# Patient Record
Sex: MALE | Race: WHITE | NOT HISPANIC OR LATINO | Employment: OTHER | URBAN - METROPOLITAN AREA
[De-identification: names, ages, dates, MRNs, and addresses within clinical notes are randomized per-mention and may not be internally consistent; named-entity substitution may affect disease eponyms.]

---

## 2021-05-14 ENCOUNTER — HOSPITAL ENCOUNTER (INPATIENT)
Facility: MEDICAL CENTER | Age: 66
LOS: 3 days | DRG: 964 | End: 2021-05-17
Attending: EMERGENCY MEDICINE | Admitting: SURGERY
Payer: COMMERCIAL

## 2021-05-14 ENCOUNTER — APPOINTMENT (OUTPATIENT)
Dept: RADIOLOGY | Facility: MEDICAL CENTER | Age: 66
DRG: 964 | End: 2021-05-14
Attending: EMERGENCY MEDICINE
Payer: COMMERCIAL

## 2021-05-14 DIAGNOSIS — S32.10XA CLOSED FRACTURE OF SACRUM, UNSPECIFIED PORTION OF SACRUM, INITIAL ENCOUNTER (HCC): ICD-10-CM
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
## 2021-05-14 DIAGNOSIS — S22.20XA CLOSED FRACTURE OF STERNUM, UNSPECIFIED PORTION OF STERNUM, INITIAL ENCOUNTER: ICD-10-CM

## 2021-05-14 PROBLEM — I10 HYPERTENSION, ESSENTIAL: Status: ACTIVE | Noted: 2021-05-14

## 2021-05-14 PROBLEM — T14.90XA TRAUMA: Status: ACTIVE | Noted: 2021-05-14

## 2021-05-14 PROBLEM — E03.9 HYPOTHYROIDISM: Status: ACTIVE | Noted: 2021-05-14

## 2021-05-14 PROBLEM — Z11.9 SCREENING EXAMINATION FOR INFECTIOUS DISEASE: Status: ACTIVE | Noted: 2021-05-14

## 2021-05-14 PROBLEM — S32.110A: Status: ACTIVE | Noted: 2021-05-14

## 2021-05-14 PROBLEM — Z78.9 NO CONTRAINDICATION TO ANTICOAGULATION THERAPY: Status: ACTIVE | Noted: 2021-05-14

## 2021-05-14 LAB
ABO + RH BLD: NORMAL
ABO GROUP BLD: NORMAL
ALBUMIN SERPL BCP-MCNC: 4.3 G/DL (ref 3.2–4.9)
ALBUMIN/GLOB SERPL: 1.5 G/DL
ALP SERPL-CCNC: 79 U/L (ref 30–99)
ALT SERPL-CCNC: 49 U/L (ref 2–50)
ANION GAP SERPL CALC-SCNC: 9 MMOL/L (ref 7–16)
APTT PPP: 24 SEC (ref 24.7–36)
AST SERPL-CCNC: 58 U/L (ref 12–45)
BILIRUB SERPL-MCNC: 0.6 MG/DL (ref 0.1–1.5)
BLD GP AB SCN SERPL QL: NORMAL
BUN SERPL-MCNC: 18 MG/DL (ref 8–22)
CALCIUM SERPL-MCNC: 9.3 MG/DL (ref 8.5–10.5)
CFT BLD TEG: 4.8 MIN (ref 5–10)
CHLORIDE SERPL-SCNC: 105 MMOL/L (ref 96–112)
CLOT ANGLE BLD TEG: 70.7 DEGREES (ref 53–72)
CLOT LYSIS 30M P MA LENFR BLD TEG: 0 % (ref 0–8)
CO2 SERPL-SCNC: 25 MMOL/L (ref 20–33)
CREAT SERPL-MCNC: 1.07 MG/DL (ref 0.5–1.4)
CT.EXTRINSIC BLD ROTEM: 1.3 MIN (ref 1–3)
ERYTHROCYTE [DISTWIDTH] IN BLOOD BY AUTOMATED COUNT: 42.1 FL (ref 35.9–50)
ETHANOL BLD-MCNC: <10.1 MG/DL (ref 0–10)
GLOBULIN SER CALC-MCNC: 2.9 G/DL (ref 1.9–3.5)
GLUCOSE SERPL-MCNC: 135 MG/DL (ref 65–99)
HCT VFR BLD AUTO: 42.6 % (ref 42–52)
HGB BLD-MCNC: 14.4 G/DL (ref 14–18)
INR PPP: 1.02 (ref 0.87–1.13)
MCF BLD TEG: 67.9 MM (ref 50–70)
MCH RBC QN AUTO: 31.9 PG (ref 27–33)
MCHC RBC AUTO-ENTMCNC: 33.8 G/DL (ref 33.7–35.3)
MCV RBC AUTO: 94.5 FL (ref 81.4–97.8)
PA AA BLD-ACNC: 12.5 %
PA ADP BLD-ACNC: 42.7 %
PLATELET # BLD AUTO: 255 K/UL (ref 164–446)
PMV BLD AUTO: 8.6 FL (ref 9–12.9)
POTASSIUM SERPL-SCNC: 4.2 MMOL/L (ref 3.6–5.5)
PROT SERPL-MCNC: 7.2 G/DL (ref 6–8.2)
PROTHROMBIN TIME: 13.7 SEC (ref 12–14.6)
RBC # BLD AUTO: 4.51 M/UL (ref 4.7–6.1)
RH BLD: NORMAL
SODIUM SERPL-SCNC: 139 MMOL/L (ref 135–145)
TEG ALGORITHM TGALG: ABNORMAL
WBC # BLD AUTO: 15.8 K/UL (ref 4.8–10.8)

## 2021-05-14 PROCEDURE — 80053 COMPREHEN METABOLIC PANEL: CPT

## 2021-05-14 PROCEDURE — 85576 BLOOD PLATELET AGGREGATION: CPT

## 2021-05-14 PROCEDURE — U0003 INFECTIOUS AGENT DETECTION BY NUCLEIC ACID (DNA OR RNA); SEVERE ACUTE RESPIRATORY SYNDROME CORONAVIRUS 2 (SARS-COV-2) (CORONAVIRUS DISEASE [COVID-19]), AMPLIFIED PROBE TECHNIQUE, MAKING USE OF HIGH THROUGHPUT TECHNOLOGIES AS DESCRIBED BY CMS-2020-01-R: HCPCS

## 2021-05-14 PROCEDURE — 96372 THER/PROPH/DIAG INJ SC/IM: CPT

## 2021-05-14 PROCEDURE — 85384 FIBRINOGEN ACTIVITY: CPT

## 2021-05-14 PROCEDURE — U0005 INFEC AGEN DETEC AMPLI PROBE: HCPCS

## 2021-05-14 PROCEDURE — 73060 X-RAY EXAM OF HUMERUS: CPT | Mod: RT

## 2021-05-14 PROCEDURE — 73130 X-RAY EXAM OF HAND: CPT | Mod: RT

## 2021-05-14 PROCEDURE — 36415 COLL VENOUS BLD VENIPUNCTURE: CPT

## 2021-05-14 PROCEDURE — 94760 N-INVAS EAR/PLS OXIMETRY 1: CPT

## 2021-05-14 PROCEDURE — 70450 CT HEAD/BRAIN W/O DYE: CPT

## 2021-05-14 PROCEDURE — 86901 BLOOD TYPING SEROLOGIC RH(D): CPT

## 2021-05-14 PROCEDURE — 85347 COAGULATION TIME ACTIVATED: CPT

## 2021-05-14 PROCEDURE — 700105 HCHG RX REV CODE 258: Performed by: SURGERY

## 2021-05-14 PROCEDURE — 85730 THROMBOPLASTIN TIME PARTIAL: CPT

## 2021-05-14 PROCEDURE — 305948 HCHG GREEN TRAUMA ACT PRE-NOTIFY NO CC

## 2021-05-14 PROCEDURE — 86900 BLOOD TYPING SEROLOGIC ABO: CPT

## 2021-05-14 PROCEDURE — 700117 HCHG RX CONTRAST REV CODE 255: Performed by: EMERGENCY MEDICINE

## 2021-05-14 PROCEDURE — 71045 X-RAY EXAM CHEST 1 VIEW: CPT

## 2021-05-14 PROCEDURE — A9270 NON-COVERED ITEM OR SERVICE: HCPCS | Performed by: SURGERY

## 2021-05-14 PROCEDURE — 700111 HCHG RX REV CODE 636 W/ 250 OVERRIDE (IP): Performed by: SURGERY

## 2021-05-14 PROCEDURE — 85610 PROTHROMBIN TIME: CPT

## 2021-05-14 PROCEDURE — 86850 RBC ANTIBODY SCREEN: CPT

## 2021-05-14 PROCEDURE — 71260 CT THORAX DX C+: CPT

## 2021-05-14 PROCEDURE — 770006 HCHG ROOM/CARE - MED/SURG/GYN SEMI*

## 2021-05-14 PROCEDURE — 72128 CT CHEST SPINE W/O DYE: CPT

## 2021-05-14 PROCEDURE — 82077 ASSAY SPEC XCP UR&BREATH IA: CPT

## 2021-05-14 PROCEDURE — 700102 HCHG RX REV CODE 250 W/ 637 OVERRIDE(OP): Performed by: SURGERY

## 2021-05-14 PROCEDURE — 70486 CT MAXILLOFACIAL W/O DYE: CPT | Mod: MH

## 2021-05-14 PROCEDURE — 72131 CT LUMBAR SPINE W/O DYE: CPT | Mod: MH

## 2021-05-14 PROCEDURE — 85027 COMPLETE CBC AUTOMATED: CPT

## 2021-05-14 PROCEDURE — 99285 EMERGENCY DEPT VISIT HI MDM: CPT | Mod: 25

## 2021-05-14 PROCEDURE — 72125 CT NECK SPINE W/O DYE: CPT | Mod: MH

## 2021-05-14 RX ORDER — LOSARTAN POTASSIUM 25 MG/1
25 TABLET ORAL DAILY
COMMUNITY

## 2021-05-14 RX ORDER — AMOXICILLIN 250 MG
1 CAPSULE ORAL NIGHTLY
Status: DISCONTINUED | OUTPATIENT
Start: 2021-05-14 | End: 2021-05-17 | Stop reason: HOSPADM

## 2021-05-14 RX ORDER — LOSARTAN POTASSIUM 25 MG/1
25 TABLET ORAL DAILY
Status: DISCONTINUED | OUTPATIENT
Start: 2021-05-14 | End: 2021-05-17 | Stop reason: HOSPADM

## 2021-05-14 RX ORDER — DOCUSATE SODIUM 100 MG/1
100 CAPSULE, LIQUID FILLED ORAL 2 TIMES DAILY
Status: DISCONTINUED | OUTPATIENT
Start: 2021-05-14 | End: 2021-05-17 | Stop reason: HOSPADM

## 2021-05-14 RX ORDER — BISACODYL 10 MG
10 SUPPOSITORY, RECTAL RECTAL
Status: DISCONTINUED | OUTPATIENT
Start: 2021-05-14 | End: 2021-05-17 | Stop reason: HOSPADM

## 2021-05-14 RX ORDER — AMOXICILLIN 250 MG
1 CAPSULE ORAL
Status: DISCONTINUED | OUTPATIENT
Start: 2021-05-14 | End: 2021-05-17 | Stop reason: HOSPADM

## 2021-05-14 RX ORDER — CELECOXIB 200 MG/1
200 CAPSULE ORAL 2 TIMES DAILY PRN
Status: DISCONTINUED | OUTPATIENT
Start: 2021-05-19 | End: 2021-05-17 | Stop reason: HOSPADM

## 2021-05-14 RX ORDER — CELECOXIB 200 MG/1
200 CAPSULE ORAL 2 TIMES DAILY
Status: DISCONTINUED | OUTPATIENT
Start: 2021-05-14 | End: 2021-05-17 | Stop reason: HOSPADM

## 2021-05-14 RX ORDER — SODIUM CHLORIDE, SODIUM LACTATE, POTASSIUM CHLORIDE, CALCIUM CHLORIDE 600; 310; 30; 20 MG/100ML; MG/100ML; MG/100ML; MG/100ML
INJECTION, SOLUTION INTRAVENOUS CONTINUOUS
Status: DISCONTINUED | OUTPATIENT
Start: 2021-05-14 | End: 2021-05-15

## 2021-05-14 RX ORDER — ENEMA 19; 7 G/133ML; G/133ML
1 ENEMA RECTAL
Status: DISCONTINUED | OUTPATIENT
Start: 2021-05-14 | End: 2021-05-17 | Stop reason: HOSPADM

## 2021-05-14 RX ORDER — LEVOTHYROXINE SODIUM 0.03 MG/1
25 TABLET ORAL
COMMUNITY

## 2021-05-14 RX ORDER — ACETAMINOPHEN 500 MG
1000 TABLET ORAL EVERY 6 HOURS PRN
Status: DISCONTINUED | OUTPATIENT
Start: 2021-05-19 | End: 2021-05-17 | Stop reason: HOSPADM

## 2021-05-14 RX ORDER — NAPROXEN SODIUM 220 MG
220 TABLET ORAL EVERY MORNING
COMMUNITY

## 2021-05-14 RX ORDER — POLYETHYLENE GLYCOL 3350 17 G/17G
1 POWDER, FOR SOLUTION ORAL 2 TIMES DAILY
Status: DISCONTINUED | OUTPATIENT
Start: 2021-05-14 | End: 2021-05-17 | Stop reason: HOSPADM

## 2021-05-14 RX ORDER — OXYCODONE HYDROCHLORIDE 10 MG/1
10 TABLET ORAL
Status: DISCONTINUED | OUTPATIENT
Start: 2021-05-14 | End: 2021-05-15

## 2021-05-14 RX ORDER — HYDROMORPHONE HYDROCHLORIDE 1 MG/ML
0.5 INJECTION, SOLUTION INTRAMUSCULAR; INTRAVENOUS; SUBCUTANEOUS
Status: DISCONTINUED | OUTPATIENT
Start: 2021-05-14 | End: 2021-05-15

## 2021-05-14 RX ORDER — ACETAMINOPHEN 500 MG
1000 TABLET ORAL EVERY 6 HOURS
Status: DISCONTINUED | OUTPATIENT
Start: 2021-05-14 | End: 2021-05-17 | Stop reason: HOSPADM

## 2021-05-14 RX ORDER — OXYCODONE HYDROCHLORIDE 5 MG/1
5 TABLET ORAL
Status: DISCONTINUED | OUTPATIENT
Start: 2021-05-14 | End: 2021-05-17 | Stop reason: HOSPADM

## 2021-05-14 RX ORDER — LEVOTHYROXINE SODIUM 0.03 MG/1
25 TABLET ORAL
Status: DISCONTINUED | OUTPATIENT
Start: 2021-05-14 | End: 2021-05-17 | Stop reason: HOSPADM

## 2021-05-14 RX ADMIN — LOSARTAN POTASSIUM 25 MG: 25 TABLET, FILM COATED ORAL at 13:50

## 2021-05-14 RX ADMIN — ENOXAPARIN SODIUM 40 MG: 40 INJECTION SUBCUTANEOUS at 13:51

## 2021-05-14 RX ADMIN — IOHEXOL 100 ML: 350 INJECTION, SOLUTION INTRAVENOUS at 10:00

## 2021-05-14 RX ADMIN — ACETAMINOPHEN 1000 MG: 500 TABLET ORAL at 22:48

## 2021-05-14 RX ADMIN — CELECOXIB 200 MG: 200 CAPSULE ORAL at 17:18

## 2021-05-14 RX ADMIN — LEVOTHYROXINE SODIUM 25 MCG: 25 TABLET ORAL at 13:50

## 2021-05-14 RX ADMIN — ACETAMINOPHEN 1000 MG: 500 TABLET ORAL at 13:50

## 2021-05-14 RX ADMIN — SODIUM CHLORIDE, POTASSIUM CHLORIDE, SODIUM LACTATE AND CALCIUM CHLORIDE: 600; 310; 30; 20 INJECTION, SOLUTION INTRAVENOUS at 13:55

## 2021-05-14 RX ADMIN — ACETAMINOPHEN 1000 MG: 500 TABLET ORAL at 17:18

## 2021-05-14 ASSESSMENT — COGNITIVE AND FUNCTIONAL STATUS - GENERAL
CLIMB 3 TO 5 STEPS WITH RAILING: A LOT
MOVING TO AND FROM BED TO CHAIR: A LOT
TOILETING: A LITTLE
WALKING IN HOSPITAL ROOM: A LOT
DRESSING REGULAR LOWER BODY CLOTHING: A LITTLE
MOBILITY SCORE: 14
TURNING FROM BACK TO SIDE WHILE IN FLAT BAD: A LITTLE
STANDING UP FROM CHAIR USING ARMS: A LITTLE
HELP NEEDED FOR BATHING: A LITTLE
SUGGESTED CMS G CODE MODIFIER MOBILITY: CL
MOVING FROM LYING ON BACK TO SITTING ON SIDE OF FLAT BED: A LOT
SUGGESTED CMS G CODE MODIFIER DAILY ACTIVITY: CJ
DAILY ACTIVITIY SCORE: 21

## 2021-05-14 ASSESSMENT — LIFESTYLE VARIABLES
EVER HAD A DRINK FIRST THING IN THE MORNING TO STEADY YOUR NERVES TO GET RID OF A HANGOVER: NO
ON A TYPICAL DAY WHEN YOU DRINK ALCOHOL HOW MANY DRINKS DO YOU HAVE: 0
EVER FELT BAD OR GUILTY ABOUT YOUR DRINKING: NO
AVERAGE NUMBER OF DAYS PER WEEK YOU HAVE A DRINK CONTAINING ALCOHOL: 0
TOTAL SCORE: 0
HOW MANY TIMES IN THE PAST YEAR HAVE YOU HAD 5 OR MORE DRINKS IN A DAY: 0
DOES PATIENT WANT TO STOP DRINKING: NO
TOTAL SCORE: 0
ALCOHOL_USE: NO
HAVE YOU EVER FELT YOU SHOULD CUT DOWN ON YOUR DRINKING: NO
CONSUMPTION TOTAL: NEGATIVE
HAVE PEOPLE ANNOYED YOU BY CRITICIZING YOUR DRINKING: NO
TOTAL SCORE: 0
DO YOU DRINK ALCOHOL: NO

## 2021-05-14 ASSESSMENT — PATIENT HEALTH QUESTIONNAIRE - PHQ9
2. FEELING DOWN, DEPRESSED, IRRITABLE, OR HOPELESS: NOT AT ALL
SUM OF ALL RESPONSES TO PHQ9 QUESTIONS 1 AND 2: 0
1. LITTLE INTEREST OR PLEASURE IN DOING THINGS: NOT AT ALL

## 2021-05-14 ASSESSMENT — FIBROSIS 4 INDEX: FIB4 SCORE: 2.11

## 2021-05-14 ASSESSMENT — PAIN DESCRIPTION - PAIN TYPE
TYPE: ACUTE PAIN
TYPE: ACUTE PAIN

## 2021-05-14 NOTE — ASSESSMENT & PLAN NOTE
Nondisplaced bilateral sacral ala fractures.  Non-operative management.  Weight bearing status - Weightbearing as tolerated BLE with therapy and a walker.  5/17 Sacrum xray completed, reviewed by Dr. Frnace and stable.  Follow up with local orthopedic surgeon in 7-10 days.  Joesph France MD. Orthopedic Surgeon. [facility].
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
## 2021-05-14 NOTE — ED NOTES
Blood drawn for PTT/PT/INR and second ABO.  Mouth swab provided.   Pt talking to wife on phone.  Plan for admission/.

## 2021-05-14 NOTE — PROGRESS NOTES
Pt arrived in room T333-1. Alert and oriented. POC discussed with pt. Verbalized understanding. Reports no further needs. Bed locked and at the lowest position. No family member present at bedside.

## 2021-05-14 NOTE — ED NOTES
Unable to completed med rec at this time   Allergies have been reviewed  Pt states that he last took his medications yesterday (nothing today)  Pt is unsure of what medications he is taking right now and unable to recall what mail order pharmacy he uses   Called pt's wife Cris ([phone]) no answer, left a message to call back
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
## 2021-05-14 NOTE — ASSESSMENT & PLAN NOTE
Nondisplaced sternum fracture with small anterior mediastinal hematoma.  EKG with SR.  Aggressive pulmonary hygiene and multimodal pain management.
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
## 2021-05-14 NOTE — ED NOTES
Pt Wife Marie timmyrogers per pt request.      Wife Marie's number [phone] - please call for any updates or need for transport home information.

## 2021-05-14 NOTE — PROGRESS NOTES
Consult requested 1110 hours  Seen approximately 1530 hours  Nondisplaced sacral U fx  Plan nonop treatment  WBAT with walker - if unable to tolerate, then may need to restrict WB

## 2021-05-14 NOTE — ED NOTES
Med Rec completed per patient's home pharmacy  Allergies reviewed  No ORAL antibiotics in last 14 days

## 2021-05-15 ENCOUNTER — APPOINTMENT (OUTPATIENT)
Dept: RADIOLOGY | Facility: MEDICAL CENTER | Age: 66
DRG: 964 | End: 2021-05-15
Attending: SURGERY
Payer: COMMERCIAL

## 2021-05-15 PROBLEM — T07.XXXA ABRASIONS OF MULTIPLE SITES: Status: ACTIVE | Noted: 2021-05-15

## 2021-05-15 LAB
ALBUMIN SERPL BCP-MCNC: 3.5 G/DL (ref 3.2–4.9)
ALBUMIN/GLOB SERPL: 1.3 G/DL
ALP SERPL-CCNC: 70 U/L (ref 30–99)
ALT SERPL-CCNC: 50 U/L (ref 2–50)
ANION GAP SERPL CALC-SCNC: 10 MMOL/L (ref 7–16)
AST SERPL-CCNC: 82 U/L (ref 12–45)
BASOPHILS # BLD AUTO: 0.2 % (ref 0–1.8)
BASOPHILS # BLD: 0.02 K/UL (ref 0–0.12)
BILIRUB SERPL-MCNC: 1.2 MG/DL (ref 0.1–1.5)
BUN SERPL-MCNC: 19 MG/DL (ref 8–22)
CALCIUM SERPL-MCNC: 8.5 MG/DL (ref 8.5–10.5)
CHLORIDE SERPL-SCNC: 101 MMOL/L (ref 96–112)
CO2 SERPL-SCNC: 25 MMOL/L (ref 20–33)
CREAT SERPL-MCNC: 1.02 MG/DL (ref 0.5–1.4)
EKG IMPRESSION: NORMAL
EOSINOPHIL # BLD AUTO: 0.09 K/UL (ref 0–0.51)
EOSINOPHIL NFR BLD: 1 % (ref 0–6.9)
ERYTHROCYTE [DISTWIDTH] IN BLOOD BY AUTOMATED COUNT: 42 FL (ref 35.9–50)
GLOBULIN SER CALC-MCNC: 2.7 G/DL (ref 1.9–3.5)
GLUCOSE SERPL-MCNC: 106 MG/DL (ref 65–99)
HCT VFR BLD AUTO: 39.3 % (ref 42–52)
HGB BLD-MCNC: 12.7 G/DL (ref 14–18)
IMM GRANULOCYTES # BLD AUTO: 0.06 K/UL (ref 0–0.11)
IMM GRANULOCYTES NFR BLD AUTO: 0.7 % (ref 0–0.9)
LYMPHOCYTES # BLD AUTO: 1.21 K/UL (ref 1–4.8)
LYMPHOCYTES NFR BLD: 14.1 % (ref 22–41)
MCH RBC QN AUTO: 31 PG (ref 27–33)
MCHC RBC AUTO-ENTMCNC: 32.3 G/DL (ref 33.7–35.3)
MCV RBC AUTO: 95.9 FL (ref 81.4–97.8)
MONOCYTES # BLD AUTO: 0.54 K/UL (ref 0–0.85)
MONOCYTES NFR BLD AUTO: 6.3 % (ref 0–13.4)
NEUTROPHILS # BLD AUTO: 6.66 K/UL (ref 1.82–7.42)
NEUTROPHILS NFR BLD: 77.7 % (ref 44–72)
NRBC # BLD AUTO: 0 K/UL
NRBC BLD-RTO: 0 /100 WBC
PLATELET # BLD AUTO: 202 K/UL (ref 164–446)
PMV BLD AUTO: 8.9 FL (ref 9–12.9)
POTASSIUM SERPL-SCNC: 4.2 MMOL/L (ref 3.6–5.5)
PROT SERPL-MCNC: 6.2 G/DL (ref 6–8.2)
RBC # BLD AUTO: 4.1 M/UL (ref 4.7–6.1)
SARS-COV-2 RNA RESP QL NAA+PROBE: NOTDETECTED
SODIUM SERPL-SCNC: 136 MMOL/L (ref 135–145)
SPECIMEN SOURCE: NORMAL
WBC # BLD AUTO: 8.6 K/UL (ref 4.8–10.8)

## 2021-05-15 PROCEDURE — 700102 HCHG RX REV CODE 250 W/ 637 OVERRIDE(OP): Performed by: SURGERY

## 2021-05-15 PROCEDURE — 93010 ELECTROCARDIOGRAM REPORT: CPT | Performed by: INTERNAL MEDICINE

## 2021-05-15 PROCEDURE — A9270 NON-COVERED ITEM OR SERVICE: HCPCS | Performed by: NURSE PRACTITIONER

## 2021-05-15 PROCEDURE — 700111 HCHG RX REV CODE 636 W/ 250 OVERRIDE (IP): Performed by: SURGERY

## 2021-05-15 PROCEDURE — 93005 ELECTROCARDIOGRAM TRACING: CPT | Performed by: NURSE PRACTITIONER

## 2021-05-15 PROCEDURE — 36415 COLL VENOUS BLD VENIPUNCTURE: CPT

## 2021-05-15 PROCEDURE — 71045 X-RAY EXAM CHEST 1 VIEW: CPT

## 2021-05-15 PROCEDURE — 700102 HCHG RX REV CODE 250 W/ 637 OVERRIDE(OP): Performed by: NURSE PRACTITIONER

## 2021-05-15 PROCEDURE — A9270 NON-COVERED ITEM OR SERVICE: HCPCS | Performed by: SURGERY

## 2021-05-15 PROCEDURE — 770006 HCHG ROOM/CARE - MED/SURG/GYN SEMI*

## 2021-05-15 PROCEDURE — 700101 HCHG RX REV CODE 250: Performed by: NURSE PRACTITIONER

## 2021-05-15 PROCEDURE — 85025 COMPLETE CBC W/AUTO DIFF WBC: CPT

## 2021-05-15 PROCEDURE — 94760 N-INVAS EAR/PLS OXIMETRY 1: CPT

## 2021-05-15 PROCEDURE — 80053 COMPREHEN METABOLIC PANEL: CPT

## 2021-05-15 RX ORDER — LIDOCAINE 50 MG/G
1-3 PATCH TOPICAL EVERY 24 HOURS
Status: DISCONTINUED | OUTPATIENT
Start: 2021-05-15 | End: 2021-05-17 | Stop reason: HOSPADM

## 2021-05-15 RX ORDER — GABAPENTIN 100 MG/1
100 CAPSULE ORAL 3 TIMES DAILY
Status: DISCONTINUED | OUTPATIENT
Start: 2021-05-15 | End: 2021-05-17 | Stop reason: HOSPADM

## 2021-05-15 RX ORDER — METAXALONE 800 MG/1
800 TABLET ORAL 3 TIMES DAILY
Status: DISCONTINUED | OUTPATIENT
Start: 2021-05-15 | End: 2021-05-17 | Stop reason: HOSPADM

## 2021-05-15 RX ORDER — BACITRACIN ZINC AND POLYMYXIN B SULFATE 500; 1000 [USP'U]/G; [USP'U]/G
OINTMENT TOPICAL 2 TIMES DAILY
Status: DISCONTINUED | OUTPATIENT
Start: 2021-05-15 | End: 2021-05-17 | Stop reason: HOSPADM

## 2021-05-15 RX ADMIN — CELECOXIB 200 MG: 200 CAPSULE ORAL at 17:52

## 2021-05-15 RX ADMIN — CELECOXIB 200 MG: 200 CAPSULE ORAL at 05:00

## 2021-05-15 RX ADMIN — METAXALONE 800 MG: 800 TABLET ORAL at 11:58

## 2021-05-15 RX ADMIN — ACETAMINOPHEN 1000 MG: 500 TABLET ORAL at 17:52

## 2021-05-15 RX ADMIN — ACETAMINOPHEN 1000 MG: 500 TABLET ORAL at 05:00

## 2021-05-15 RX ADMIN — GABAPENTIN 100 MG: 100 CAPSULE ORAL at 11:58

## 2021-05-15 RX ADMIN — Medication 2 EACH: at 17:57

## 2021-05-15 RX ADMIN — METAXALONE 800 MG: 800 TABLET ORAL at 17:52

## 2021-05-15 RX ADMIN — OXYCODONE 5 MG: 5 TABLET ORAL at 20:20

## 2021-05-15 RX ADMIN — OXYCODONE 5 MG: 5 TABLET ORAL at 04:59

## 2021-05-15 RX ADMIN — LIDOCAINE 1 PATCH: 50 PATCH TOPICAL at 09:27

## 2021-05-15 RX ADMIN — LEVOTHYROXINE SODIUM 25 MCG: 25 TABLET ORAL at 05:00

## 2021-05-15 RX ADMIN — ENOXAPARIN SODIUM 40 MG: 40 INJECTION SUBCUTANEOUS at 05:01

## 2021-05-15 RX ADMIN — LOSARTAN POTASSIUM 25 MG: 25 TABLET, FILM COATED ORAL at 05:00

## 2021-05-15 RX ADMIN — OXYCODONE 5 MG: 5 TABLET ORAL at 14:39

## 2021-05-15 RX ADMIN — GABAPENTIN 100 MG: 100 CAPSULE ORAL at 17:52

## 2021-05-15 ASSESSMENT — ENCOUNTER SYMPTOMS
SENSORY CHANGE: 0
DOUBLE VISION: 0
MYALGIAS: 0
BACK PAIN: 1
SPEECH CHANGE: 0
VOMITING: 0
HEADACHES: 0
NAUSEA: 0
BLURRED VISION: 0
FOCAL WEAKNESS: 0
DIZZINESS: 0
ROS GI COMMENTS: BM PRIOR TO ARRIVAL
TINGLING: 0
SHORTNESS OF BREATH: 0
CHILLS: 0
FEVER: 0
ABDOMINAL PAIN: 0
NECK PAIN: 0

## 2021-05-15 ASSESSMENT — PAIN DESCRIPTION - PAIN TYPE
TYPE: ACUTE PAIN

## 2021-05-15 ASSESSMENT — COPD QUESTIONNAIRES
DO YOU EVER COUGH UP ANY MUCUS OR PHLEGM?: NO/ONLY WITH OCCASIONAL COLDS OR INFECTIONS
HAVE YOU SMOKED AT LEAST 100 CIGARETTES IN YOUR ENTIRE LIFE: NO/DON'T KNOW
COPD SCREENING SCORE: 2
DURING THE PAST 4 WEEKS HOW MUCH DID YOU FEEL SHORT OF BREATH: NONE/LITTLE OF THE TIME

## 2021-05-15 ASSESSMENT — LIFESTYLE VARIABLES: SUBSTANCE_ABUSE: 0

## 2021-05-15 NOTE — PROGRESS NOTES
TRAUMA TERTIARY SURVEY     Mental status adequate for full examination?: Yes    Spine cleared (radiologically and/or clinically): Yes    PHYSICAL EXAMINATION:  Vitals: BP (!) 95/59   Pulse 91   Temp 36.2 °C (97.2 °F) (Temporal)   Resp 17   Ht 1.829 m (6')   Wt 96.5 kg (212 lb 11.2 oz)   SpO2 96%   BMI 28.85 kg/m²   Constitutional:     General Appearance: appears stated age, is in no apparent distress, is well developed and well nourished.  HEENT:     No significant external craniofacial trauma. The pupils are equal, round, and reactive to light bilaterally. The extraocular muscles are intact bilaterally.. The nares and oropharynx are clear. The midface and jaw are stable. No malocclusion is evident.  Neck:    The cervical spine is supple and non tender. Normal range of motion. No posterior midline cervical-spine tenderness, no evidence of intoxication, normal level of alertness (Tez Coma Scale 15), no focal neurologic deficit, and no painful distracting injuries. The trachea is midline. No significant abrasions, lacerations, contusions, punctures, or swelling. No crepitance. No jugulovenous distension.  Respiratory:   Inspection: Unlabored respirations, no intercostal retractions, paradoxical motion, or accessory muscle use.   Palpation:  The chest is nontender. The clavicles are non deformed bilaterally..   Auscultation: clear to auscultation.  Cardiovascular:   Auscultation: regular rate and rhythm.   Peripheral Pulses: Normal.   Abdomen:   Abdomen is soft, nontender, without organomegaly or masses.  Genitourinary:   (MALE): Not visualized.  Musculoskeletal:   Pelvic and low back tenderness.  No significant angulation, deformity, or soft tissue injury involving the upper and lower extremities. Normal range of motion. Right hand edematous with ecchymosis, full ROM.  Skin:   The skin is warm and dry. Scattered abrasions/skin tears to BUE.  Neurologic:    Tez Coma Scale (GCS) 15 [id number]. Neurologic  examination revealed no focal deficits noted, mental status intact.  Psychiatric:   The patient does not appear depressed or anxious.    IMAGING:  DX-CHEST-PORTABLE (1 VIEW)   Final Result         1.  No acute cardiopulmonary disease.      DX-HAND 3+ RIGHT   Final Result      1.  There is no acute fracture of the right hand.   2.  There is multifocal mild degenerative change.      CT-CHEST,ABDOMEN,PELVIS WITH   Final Result      Anterior mediastinal hematoma with nondisplaced sternal fractures.      Nondisplaced fractures of the bilateral sacral alar are noted.      Minimal stranding in the left pelvis is likely posttraumatic.      Subcutaneous stranding in the left upper thorax and anterior right thorax is likely posttraumatic.      No solid organ or vascular injury is identified.      Colonic diverticulosis.      For description of thoracic spine findings, please refer to dedicated CT of the thoracic spine.      Mild cardiomegaly.         CT-TSPINE W/O PLUS RECONS   Final Result      1.  There is no acute fracture of the thoracic spine.   2.  There are chronic fracture deformities with mild kyphosis at the T11-T12 level.      CT-LSPINE W/O PLUS RECONS   Final Result      1.  There is no acute fracture of lumbar spine.   2.  There is chronic fracture deformity at the thoracolumbar junction.      CT-HEAD W/O   Final Result      No acute intracranial abnormality is identified.      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      There are periventricular and subcortical white matter changes present.  This finding is nonspecific and could be from previous small vessel ischemia, demyelination, or gliosis.         CT-MAXILLOFACIAL W/O PLUS RECONS   Final Result         1.  There is no acute displaced fracture of the facial bones or orbital regions.      CT-CSPINE WITHOUT PLUS RECONS   Final Result      1.  There is no acute fracture of the cervical spine.   2.  There is multilevel degenerative change with  very minimal amount of neural foraminal narrowing.         DX-HUMERUS 2+ RIGHT   Final Result      No evidence of acute fracture or dislocation.      Degenerative changes.      Old postsurgical and posttraumatic changes of the proximal humerus.      Soft tissue swelling.         DX-CHEST-LIMITED (1 VIEW)   Final Result      No acute cardiopulmonary process is identified.        All current laboratory studies/radiology exams reviewed: Yes    Completed Consultations:  Dr. France, orthopedic surgery     Pending Consultations:  None    Newly Identified Diagnoses and Injuries:  None    TOTAL RAP SCORE:  RAP Score Total: 9      ETOH Screening  CAGE Score: 0  Assessment complete date: 5/15/2021 (Admission BA negative, CAGE negative)

## 2021-05-15 NOTE — CARE PLAN
Problem: Pain - Standard  Goal: Alleviation of pain or a reduction in pain to the patient’s comfort goal  Outcome: Progressing     Problem: Fall Risk  Goal: Patient will remain free from falls  Outcome: Progressing

## 2021-05-15 NOTE — PROGRESS NOTES
2 RN Skin Check    2 RN skin check completed with ALVIN RN.   Devices in place: SCD, NC, PIV.  Skin assessed under devices: yes.  Confirmed pressure ulcers found on: n/a.  New potential pressure ulcers noted on n/a.   Wound consult placed n/a.  The following interventions in place pillows in place for support and positioning.    Skin: Scattered bruising to BLE. Ears, Elbows, Knees, Heels and Sacrum intact. Patient able to turn self in bed.
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
## 2021-05-16 PROBLEM — R93.0 ABNORMAL CT OF THE HEAD: Status: ACTIVE | Noted: 2021-05-16

## 2021-05-16 LAB
ALBUMIN SERPL BCP-MCNC: 3.4 G/DL (ref 3.2–4.9)
ALBUMIN/GLOB SERPL: 1.3 G/DL
ALP SERPL-CCNC: 66 U/L (ref 30–99)
ALT SERPL-CCNC: 44 U/L (ref 2–50)
ANION GAP SERPL CALC-SCNC: 7 MMOL/L (ref 7–16)
AST SERPL-CCNC: 59 U/L (ref 12–45)
BASOPHILS # BLD AUTO: 0.4 % (ref 0–1.8)
BASOPHILS # BLD: 0.03 K/UL (ref 0–0.12)
BILIRUB SERPL-MCNC: 0.3 MG/DL (ref 0.1–1.5)
BUN SERPL-MCNC: 23 MG/DL (ref 8–22)
CALCIUM SERPL-MCNC: 8.5 MG/DL (ref 8.5–10.5)
CHLORIDE SERPL-SCNC: 101 MMOL/L (ref 96–112)
CO2 SERPL-SCNC: 24 MMOL/L (ref 20–33)
CREAT SERPL-MCNC: 1.14 MG/DL (ref 0.5–1.4)
EOSINOPHIL # BLD AUTO: 0.23 K/UL (ref 0–0.51)
EOSINOPHIL NFR BLD: 2.7 % (ref 0–6.9)
ERYTHROCYTE [DISTWIDTH] IN BLOOD BY AUTOMATED COUNT: 41.8 FL (ref 35.9–50)
GLOBULIN SER CALC-MCNC: 2.7 G/DL (ref 1.9–3.5)
GLUCOSE SERPL-MCNC: 104 MG/DL (ref 65–99)
HCT VFR BLD AUTO: 35.7 % (ref 42–52)
HGB BLD-MCNC: 11.8 G/DL (ref 14–18)
IMM GRANULOCYTES # BLD AUTO: 0.04 K/UL (ref 0–0.11)
IMM GRANULOCYTES NFR BLD AUTO: 0.5 % (ref 0–0.9)
LYMPHOCYTES # BLD AUTO: 1.5 K/UL (ref 1–4.8)
LYMPHOCYTES NFR BLD: 17.7 % (ref 22–41)
MCH RBC QN AUTO: 31.6 PG (ref 27–33)
MCHC RBC AUTO-ENTMCNC: 33.1 G/DL (ref 33.7–35.3)
MCV RBC AUTO: 95.7 FL (ref 81.4–97.8)
MONOCYTES # BLD AUTO: 0.56 K/UL (ref 0–0.85)
MONOCYTES NFR BLD AUTO: 6.6 % (ref 0–13.4)
NEUTROPHILS # BLD AUTO: 6.11 K/UL (ref 1.82–7.42)
NEUTROPHILS NFR BLD: 72.1 % (ref 44–72)
NRBC # BLD AUTO: 0 K/UL
NRBC BLD-RTO: 0 /100 WBC
PLATELET # BLD AUTO: 173 K/UL (ref 164–446)
PMV BLD AUTO: 8.6 FL (ref 9–12.9)
POTASSIUM SERPL-SCNC: 3.8 MMOL/L (ref 3.6–5.5)
PROT SERPL-MCNC: 6.1 G/DL (ref 6–8.2)
RBC # BLD AUTO: 3.73 M/UL (ref 4.7–6.1)
SODIUM SERPL-SCNC: 132 MMOL/L (ref 135–145)
WBC # BLD AUTO: 8.5 K/UL (ref 4.8–10.8)

## 2021-05-16 PROCEDURE — 770001 HCHG ROOM/CARE - MED/SURG/GYN PRIV*

## 2021-05-16 PROCEDURE — 80053 COMPREHEN METABOLIC PANEL: CPT

## 2021-05-16 PROCEDURE — 700102 HCHG RX REV CODE 250 W/ 637 OVERRIDE(OP): Performed by: SURGERY

## 2021-05-16 PROCEDURE — 700111 HCHG RX REV CODE 636 W/ 250 OVERRIDE (IP): Performed by: NURSE PRACTITIONER

## 2021-05-16 PROCEDURE — 97161 PT EVAL LOW COMPLEX 20 MIN: CPT

## 2021-05-16 PROCEDURE — A9270 NON-COVERED ITEM OR SERVICE: HCPCS | Performed by: NURSE PRACTITIONER

## 2021-05-16 PROCEDURE — 700102 HCHG RX REV CODE 250 W/ 637 OVERRIDE(OP): Performed by: NURSE PRACTITIONER

## 2021-05-16 PROCEDURE — 36415 COLL VENOUS BLD VENIPUNCTURE: CPT

## 2021-05-16 PROCEDURE — 94760 N-INVAS EAR/PLS OXIMETRY 1: CPT

## 2021-05-16 PROCEDURE — A9270 NON-COVERED ITEM OR SERVICE: HCPCS | Performed by: SURGERY

## 2021-05-16 PROCEDURE — 700101 HCHG RX REV CODE 250: Performed by: NURSE PRACTITIONER

## 2021-05-16 PROCEDURE — 85025 COMPLETE CBC W/AUTO DIFF WBC: CPT

## 2021-05-16 PROCEDURE — 97166 OT EVAL MOD COMPLEX 45 MIN: CPT

## 2021-05-16 RX ADMIN — ACETAMINOPHEN 1000 MG: 500 TABLET ORAL at 05:03

## 2021-05-16 RX ADMIN — GABAPENTIN 100 MG: 100 CAPSULE ORAL at 11:44

## 2021-05-16 RX ADMIN — Medication 1 EACH: at 05:05

## 2021-05-16 RX ADMIN — LIDOCAINE 2 PATCH: 50 PATCH TOPICAL at 10:25

## 2021-05-16 RX ADMIN — CELECOXIB 200 MG: 200 CAPSULE ORAL at 05:03

## 2021-05-16 RX ADMIN — ACETAMINOPHEN 1000 MG: 500 TABLET ORAL at 23:16

## 2021-05-16 RX ADMIN — GABAPENTIN 100 MG: 100 CAPSULE ORAL at 17:31

## 2021-05-16 RX ADMIN — ACETAMINOPHEN 1000 MG: 500 TABLET ORAL at 17:22

## 2021-05-16 RX ADMIN — ACETAMINOPHEN 1000 MG: 500 TABLET ORAL at 11:44

## 2021-05-16 RX ADMIN — METAXALONE 800 MG: 800 TABLET ORAL at 17:23

## 2021-05-16 RX ADMIN — ENOXAPARIN SODIUM 30 MG: 30 INJECTION SUBCUTANEOUS at 17:24

## 2021-05-16 RX ADMIN — METAXALONE 800 MG: 800 TABLET ORAL at 11:44

## 2021-05-16 RX ADMIN — Medication 2 EACH: at 17:25

## 2021-05-16 RX ADMIN — LOSARTAN POTASSIUM 25 MG: 25 TABLET, FILM COATED ORAL at 05:03

## 2021-05-16 RX ADMIN — GABAPENTIN 100 MG: 100 CAPSULE ORAL at 05:03

## 2021-05-16 RX ADMIN — LEVOTHYROXINE SODIUM 25 MCG: 25 TABLET ORAL at 05:03

## 2021-05-16 RX ADMIN — METAXALONE 800 MG: 800 TABLET ORAL at 05:03

## 2021-05-16 RX ADMIN — CELECOXIB 200 MG: 200 CAPSULE ORAL at 17:23

## 2021-05-16 RX ADMIN — ENOXAPARIN SODIUM 30 MG: 30 INJECTION SUBCUTANEOUS at 05:02

## 2021-05-16 ASSESSMENT — ACTIVITIES OF DAILY LIVING (ADL): TOILETING: INDEPENDENT

## 2021-05-16 ASSESSMENT — PAIN DESCRIPTION - PAIN TYPE
TYPE: ACUTE PAIN

## 2021-05-16 ASSESSMENT — COGNITIVE AND FUNCTIONAL STATUS - GENERAL
STANDING UP FROM CHAIR USING ARMS: A LITTLE
TURNING FROM BACK TO SIDE WHILE IN FLAT BAD: A LITTLE
CLIMB 3 TO 5 STEPS WITH RAILING: A LITTLE
PERSONAL GROOMING: A LITTLE
TOILETING: A LITTLE
SUGGESTED CMS G CODE MODIFIER DAILY ACTIVITY: CK
SUGGESTED CMS G CODE MODIFIER MOBILITY: CK
DAILY ACTIVITIY SCORE: 19
DRESSING REGULAR LOWER BODY CLOTHING: A LITTLE
DRESSING REGULAR UPPER BODY CLOTHING: A LITTLE
MOBILITY SCORE: 18
WALKING IN HOSPITAL ROOM: A LITTLE
MOVING TO AND FROM BED TO CHAIR: A LITTLE
MOVING FROM LYING ON BACK TO SITTING ON SIDE OF FLAT BED: A LITTLE
HELP NEEDED FOR BATHING: A LITTLE

## 2021-05-16 ASSESSMENT — ENCOUNTER SYMPTOMS
DIZZINESS: 0
ABDOMINAL PAIN: 0
SPEECH CHANGE: 0
BACK PAIN: 0
DOUBLE VISION: 0
NECK PAIN: 0
MYALGIAS: 0
HEADACHES: 0
NAUSEA: 0
SHORTNESS OF BREATH: 0
ROS GI COMMENTS: BM PRIOR TO ARRIVAL
SENSORY CHANGE: 0
FOCAL WEAKNESS: 0
CHILLS: 0
FEVER: 0
TINGLING: 0
VOMITING: 0
BLURRED VISION: 0

## 2021-05-16 ASSESSMENT — GAIT ASSESSMENTS
ASSISTIVE DEVICE: FRONT WHEEL WALKER
GAIT LEVEL OF ASSIST: SUPERVISED
DISTANCE (FEET): 150
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
## 2021-05-16 NOTE — PROGRESS NOTES
Pain improved  Up on walker when I saw him  No bladder or bowel symptoms  AVSS  + DF/PF  Mobilize with walker  Check lateral sacrum XR tomorrow
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
## 2021-05-16 NOTE — CARE PLAN
Problem: Pain - Standard  Goal: Alleviation of pain or a reduction in pain to the patient’s comfort goal  Outcome: Progressing     Problem: Respiratory/Oxygenation Function Post-Surgical  Goal: Patient will achieve/maintain normal respiratory rate/effort  Outcome: Progressing  Flowsheets (Taken 5/16/2021 0802)  Incentive Spirometer Volume: 2000 mL   The patient is Stable - Low risk of patient condition declining or worsening         Progress made toward(s) clinical / shift goals:  Progressing    Patient is not progressing towards the following goals:
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
## 2021-05-16 NOTE — CARE PLAN
Problem: Pain - Standard  Goal: Alleviation of pain or a reduction in pain to the patient’s comfort goal  5/15/2021 2300 by Dawn Cartagena R.N.  Outcome: Progressing  5/15/2021 2300 by Dawn Cartagena R.N.  Outcome: Progressing     Problem: Fall Risk  Goal: Patient will remain free from falls  5/15/2021 2300 by BREONNA TijerinaN.  Outcome: Progressing  5/15/2021 2300 by Dawn Cartagena R.N.  Outcome: Progressing
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
## 2021-05-17 ENCOUNTER — APPOINTMENT (OUTPATIENT)
Dept: RADIOLOGY | Facility: MEDICAL CENTER | Age: 66
DRG: 964 | End: 2021-05-17
Attending: NURSE PRACTITIONER
Payer: COMMERCIAL

## 2021-05-17 VITALS
BODY MASS INDEX: 28.81 KG/M2 | WEIGHT: 212.7 LBS | HEIGHT: 72 IN | HEART RATE: 69 BPM | TEMPERATURE: 97.1 F | SYSTOLIC BLOOD PRESSURE: 134 MMHG | DIASTOLIC BLOOD PRESSURE: 81 MMHG | RESPIRATION RATE: 17 BRPM | OXYGEN SATURATION: 96 %

## 2021-05-17 LAB
MAGNESIUM SERPL-MCNC: 2.3 MG/DL (ref 1.5–2.5)
PHOSPHATE SERPL-MCNC: 3.2 MG/DL (ref 2.5–4.5)

## 2021-05-17 PROCEDURE — 71046 X-RAY EXAM CHEST 2 VIEWS: CPT

## 2021-05-17 PROCEDURE — 83735 ASSAY OF MAGNESIUM: CPT

## 2021-05-17 PROCEDURE — 700102 HCHG RX REV CODE 250 W/ 637 OVERRIDE(OP): Performed by: SURGERY

## 2021-05-17 PROCEDURE — 700102 HCHG RX REV CODE 250 W/ 637 OVERRIDE(OP): Performed by: NURSE PRACTITIONER

## 2021-05-17 PROCEDURE — 94760 N-INVAS EAR/PLS OXIMETRY 1: CPT

## 2021-05-17 PROCEDURE — 84100 ASSAY OF PHOSPHORUS: CPT

## 2021-05-17 PROCEDURE — 36415 COLL VENOUS BLD VENIPUNCTURE: CPT

## 2021-05-17 PROCEDURE — A9270 NON-COVERED ITEM OR SERVICE: HCPCS | Performed by: SURGERY

## 2021-05-17 PROCEDURE — 700101 HCHG RX REV CODE 250: Performed by: NURSE PRACTITIONER

## 2021-05-17 PROCEDURE — 72220 X-RAY EXAM SACRUM TAILBONE: CPT

## 2021-05-17 PROCEDURE — A9270 NON-COVERED ITEM OR SERVICE: HCPCS | Performed by: NURSE PRACTITIONER

## 2021-05-17 PROCEDURE — 700111 HCHG RX REV CODE 636 W/ 250 OVERRIDE (IP): Performed by: NURSE PRACTITIONER

## 2021-05-17 RX ORDER — ACETAMINOPHEN 325 MG/1
650 TABLET ORAL EVERY 6 HOURS PRN
COMMUNITY
Start: 2021-05-17

## 2021-05-17 RX ORDER — BACITRACIN ZINC AND POLYMYXIN B SULFATE 500; 1000 [USP'U]/G; [USP'U]/G
OINTMENT TOPICAL PRN
COMMUNITY
Start: 2021-05-17

## 2021-05-17 RX ADMIN — LEVOTHYROXINE SODIUM 25 MCG: 25 TABLET ORAL at 04:37

## 2021-05-17 RX ADMIN — Medication 2 EACH: at 04:39

## 2021-05-17 RX ADMIN — LOSARTAN POTASSIUM 25 MG: 25 TABLET, FILM COATED ORAL at 04:37

## 2021-05-17 RX ADMIN — GABAPENTIN 100 MG: 100 CAPSULE ORAL at 12:34

## 2021-05-17 RX ADMIN — GABAPENTIN 100 MG: 100 CAPSULE ORAL at 04:39

## 2021-05-17 RX ADMIN — ACETAMINOPHEN 1000 MG: 500 TABLET ORAL at 12:34

## 2021-05-17 RX ADMIN — ACETAMINOPHEN 1000 MG: 500 TABLET ORAL at 04:37

## 2021-05-17 RX ADMIN — ENOXAPARIN SODIUM 30 MG: 30 INJECTION SUBCUTANEOUS at 04:37

## 2021-05-17 RX ADMIN — CELECOXIB 200 MG: 200 CAPSULE ORAL at 04:36

## 2021-05-17 ASSESSMENT — ENCOUNTER SYMPTOMS
FOCAL WEAKNESS: 0
HEADACHES: 0
DIZZINESS: 0
BLURRED VISION: 0
VOMITING: 0
SHORTNESS OF BREATH: 0
SPEECH CHANGE: 0
ABDOMINAL PAIN: 0
BACK PAIN: 0
MYALGIAS: 0
NECK PAIN: 0
NAUSEA: 0
DOUBLE VISION: 0
SENSORY CHANGE: 0
CONSTIPATION: 0
FEVER: 0
TINGLING: 0
CHILLS: 0

## 2021-05-17 ASSESSMENT — PAIN DESCRIPTION - PAIN TYPE: TYPE: ACUTE PAIN

## 2021-05-17 NOTE — CARE PLAN
The patient is Stable - Low risk of patient condition declining or worsening         Progress made toward(s) clinical / shift goals:      Problem: Knowledge Deficit - Standard  Goal: Patient and family/care givers will demonstrate understanding of plan of care, disease process/condition, diagnostic tests and medications  Outcome: Progressing     Problem: Pain - Standard  Goal: Alleviation of pain or a reduction in pain to the patient’s comfort goal  Outcome: Progressing   Available pain medications reviewed with patient. Pain managed by scheduled medications. Encouraged to call if pain is no longer managed.     Problem: Fall Risk  Goal: Patient will remain free from falls  Outcome: Progressing   Fall precautions in place. Patient rounded on hourly. Clutter-free environment maintained. Bed alarm on, bed locked and in lowest position. Call light in reach.

## 2021-05-17 NOTE — DISCHARGE INSTRUCTIONS
Discharge Instructions    Discharged to home by car with relative. Discharged via wheelchair, hospital escort: Yes.  Special equipment needed: Not Applicable    Be sure to schedule a follow-up appointment with your primary care doctor or any specialists as instructed.     Discharge Plan:        I understand that a diet low in cholesterol, fat, and sodium is recommended for good health. Unless I have been given specific instructions below for another diet, I accept this instruction as my diet prescription.   Other diet: reg    Special Instructions: None    · Is patient discharged on Warfarin / Coumadin?   No     Depression / Suicide Risk    As you are discharged from this [facility] facility, it is important to learn how to keep safe from harming yourself.    Recognize the warning signs:  · Abrupt changes in personality, positive or negative- including increase in energy   · Giving away possessions  · Change in eating patterns- significant weight changes-  positive or negative  · Change in sleeping patterns- unable to sleep or sleeping all the time   · Unwillingness or inability to communicate  · Depression  · Unusual sadness, discouragement and loneliness  · Talk of wanting to die  · Neglect of personal appearance   · Rebelliousness- reckless behavior  · Withdrawal from people/activities they love  · Confusion- inability to concentrate     If you or a loved one observes any of these behaviors or has concerns about self-harm, here's what you can do:  · Talk about it- your feelings and reasons for harming yourself  · Remove any means that you might use to hurt yourself (examples: pills, rope, extension cords, firearm)  · Get professional help from the community (Mental Health, Substance Abuse, psychological counseling)  · Do not be alone:Call your Safe Contact- someone whom you trust who will be there for you.  · Call your local CRISIS HOTLINE 201-4210 or 692-066-7437  · Call your local Children's Mobile Crisis  Response Team [facility] (979) 428-0341 or www.[employer].appAttach  · Call the toll free National Suicide Prevention Hotlines   · National Suicide Prevention Lifeline 541-788-VGMH (7607)  · National Hope Line Network 800-SUICIDE (937-9584)      - Call or seek medical attention for questions or concerns  - Follow up with Dr. Estevez as needed  - Follow up with Dr. France, or local orthopedic surgeon, in 1 weeks time for repeat pelvic xrays, continue weightbearing as tolerated to bilateral lower extremities with a walker  - Follow up with primary care provider within one weeks time, may need a referral to neurology for abnormal CT of brain  - Resume regular diet  - May take over the counter acetaminophen or ibuprofen as needed for pain  - No swimming, hot tubs, baths or wound submersion until cleared by outpatient provider. May shower  - Apply over the counter polysporin ointment to abrasions as needed  - No contact sports, strenuous activities, or heavy lifting until cleared by outpatient provider  - If respiratory decompensation, persistent or worsening pain, changes in sensation or motor function, or signs or symptoms of infection occur seek medical attention
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
## 2021-05-17 NOTE — PROGRESS NOTES
Mobility Progress Note    Surgery patient: No  Date of surgery: n/a  Ambulated 50 ft on day of surgery? (N/A if patient did not undergo surgery today): n/a  Number of times ambulated 50 feet or greater today: 1  Patient has been up to chair, edge of bed or HOB 90 degrees for all meals?: Yes  Goal met? (goal is ambulating at least 50 feet 2 times on day shift, one time on night shift): Yes  If patient did not meet mobility goal, why?: n/a

## 2023-03-17 NOTE — ED NOTES
Report given to Jesica RN.  Transport at bedside for transfer of pt to [facility], all belongings with pt on transfer.    Bleeding that does not stop/Swelling that gets worse/Pain not relieved by Medications/Fever greater than (need to indicate Fahrenheit or Celsius)/Wound/Surgical Site with redness, or foul smelling discharge or pus/Numbness, tingling, color or temperature change to extremity/Nausea and vomiting that does not stop/Unable to urinate/Excessive diarrhea/Inability to tolerate liquids or foods/Increased irritability or sluggishness